# Patient Record
Sex: MALE | Race: WHITE | NOT HISPANIC OR LATINO
[De-identification: names, ages, dates, MRNs, and addresses within clinical notes are randomized per-mention and may not be internally consistent; named-entity substitution may affect disease eponyms.]

---

## 2019-01-28 ENCOUNTER — APPOINTMENT (OUTPATIENT)
Dept: SURGERY | Facility: CLINIC | Age: 59
End: 2019-01-28
Payer: MEDICARE

## 2019-01-28 VITALS
DIASTOLIC BLOOD PRESSURE: 84 MMHG | BODY MASS INDEX: 24.59 KG/M2 | SYSTOLIC BLOOD PRESSURE: 146 MMHG | WEIGHT: 166 LBS | HEIGHT: 69 IN

## 2019-01-28 DIAGNOSIS — Z87.442 PERSONAL HISTORY OF URINARY CALCULI: ICD-10-CM

## 2019-01-28 DIAGNOSIS — Z86.59 PERSONAL HISTORY OF OTHER MENTAL AND BEHAVIORAL DISORDERS: ICD-10-CM

## 2019-01-28 DIAGNOSIS — Z78.9 OTHER SPECIFIED HEALTH STATUS: ICD-10-CM

## 2019-01-28 DIAGNOSIS — Z80.0 FAMILY HISTORY OF MALIGNANT NEOPLASM OF DIGESTIVE ORGANS: ICD-10-CM

## 2019-01-28 DIAGNOSIS — Z86.39 PERSONAL HISTORY OF OTHER ENDOCRINE, NUTRITIONAL AND METABOLIC DISEASE: ICD-10-CM

## 2019-01-28 PROBLEM — Z00.00 ENCOUNTER FOR PREVENTIVE HEALTH EXAMINATION: Status: ACTIVE | Noted: 2019-01-28

## 2019-01-28 PROCEDURE — 99202 OFFICE O/P NEW SF 15 MIN: CPT

## 2019-01-29 PROBLEM — Z80.0 FAMILY HISTORY OF LIVER CANCER: Status: ACTIVE | Noted: 2019-01-28

## 2019-01-29 PROBLEM — Z86.59 HISTORY OF INTELLECTUAL DISABILITY: Status: RESOLVED | Noted: 2019-01-29 | Resolved: 2019-01-29

## 2019-01-29 PROBLEM — Z87.442 HISTORY OF KIDNEY STONES: Status: RESOLVED | Noted: 2019-01-28 | Resolved: 2019-01-29

## 2019-01-29 PROBLEM — Z86.39 HISTORY OF PHENYLKETONURIA: Status: RESOLVED | Noted: 2019-01-29 | Resolved: 2019-01-29

## 2019-01-29 PROBLEM — Z78.9 CAFFEINE USE: Status: ACTIVE | Noted: 2019-01-28

## 2019-01-29 NOTE — HISTORY OF PRESENT ILLNESS
[de-identified] : The patient is brought by his brother for a possible inguinal hernia a coin was noted on exam by his primary care physician who referred him for this surgical evaluation and for a sonogram of the area.  He does not provide any history due to his mental retardation.

## 2019-01-29 NOTE — PHYSICAL EXAM
[Normal Thyroid] : the thyroid was normal [Normal Breath Sounds] : Normal breath sounds [Normal Heart Sounds] : normal heart sounds [Normal Rate and Rhythm] : normal rate and rhythm [Abdominal Masses] : No abdominal masses [Abdomen Tenderness] : ~T ~M No abdominal tenderness [No HSM] : no hepatosplenomegaly [de-identified] : At least 6 inclusion cysts of the scalp, the largest at 2 cm in size. [de-identified] : no adenopathy [de-identified] : Soft and not distended. Questionable umbilical hernia. Freely reducible bilateral inguinal hernias with no local acute changes, much larger on the right than the left. Neither are scrotal. The external genitalia are unremarkable.

## 2019-01-29 NOTE — REASON FOR VISIT
[Initial Evaluation] : an initial evaluation [Family Member] : family member [FreeTextEntry1] : inguinal hernia

## 2019-01-29 NOTE — ASSESSMENT
[FreeTextEntry1] : Bilateral inguinal hernias as noted above, for which elective repair with mesh is advised, so as to try to avoid the possible complications of an untreated hernia, which were explained. The procedure was also explained in full with its risks and benefits and all questions were answered. The brother wishes to have this done promptly and also asks about excising the scalp cysts at the same time. I do not believe that this is advisable, and this can be left for a later date.  Appropriate precautions and warning signs were advised for the interim and he will contact the office for scheduling. As the hernias appear to be clear-cut on examination I did not feel that the sonogram needs to be done. The brother is happy with the assessment and plan.

## 2019-02-04 ENCOUNTER — OUTPATIENT (OUTPATIENT)
Dept: OUTPATIENT SERVICES | Facility: HOSPITAL | Age: 59
LOS: 1 days | Discharge: HOME | End: 2019-02-04

## 2019-02-04 VITALS
DIASTOLIC BLOOD PRESSURE: 70 MMHG | OXYGEN SATURATION: 98 % | TEMPERATURE: 99 F | WEIGHT: 162.04 LBS | HEART RATE: 16 BPM | SYSTOLIC BLOOD PRESSURE: 122 MMHG | RESPIRATION RATE: 16 BRPM | HEIGHT: 69 IN

## 2019-02-04 DIAGNOSIS — Z01.818 ENCOUNTER FOR OTHER PREPROCEDURAL EXAMINATION: ICD-10-CM

## 2019-02-04 DIAGNOSIS — K40.20 BILATERAL INGUINAL HERNIA, WITHOUT OBSTRUCTION OR GANGRENE, NOT SPECIFIED AS RECURRENT: ICD-10-CM

## 2019-02-04 DIAGNOSIS — Z98.890 OTHER SPECIFIED POSTPROCEDURAL STATES: Chronic | ICD-10-CM

## 2019-02-04 LAB
ALBUMIN SERPL ELPH-MCNC: 4.9 G/DL — SIGNIFICANT CHANGE UP (ref 3.5–5.2)
ALP SERPL-CCNC: 77 U/L — SIGNIFICANT CHANGE UP (ref 30–115)
ALT FLD-CCNC: 23 U/L — SIGNIFICANT CHANGE UP (ref 0–41)
ANION GAP SERPL CALC-SCNC: 17 MMOL/L — HIGH (ref 7–14)
APPEARANCE UR: CLEAR — SIGNIFICANT CHANGE UP
APTT BLD: 31.4 SEC — SIGNIFICANT CHANGE UP (ref 27–39.2)
AST SERPL-CCNC: 20 U/L — SIGNIFICANT CHANGE UP (ref 0–41)
BASOPHILS # BLD AUTO: 0.06 K/UL — SIGNIFICANT CHANGE UP (ref 0–0.2)
BASOPHILS NFR BLD AUTO: 0.6 % — SIGNIFICANT CHANGE UP (ref 0–1)
BILIRUB SERPL-MCNC: 0.4 MG/DL — SIGNIFICANT CHANGE UP (ref 0.2–1.2)
BILIRUB UR-MCNC: NEGATIVE — SIGNIFICANT CHANGE UP
BUN SERPL-MCNC: 14 MG/DL — SIGNIFICANT CHANGE UP (ref 10–20)
CALCIUM SERPL-MCNC: 9.5 MG/DL — SIGNIFICANT CHANGE UP (ref 8.5–10.1)
CHLORIDE SERPL-SCNC: 100 MMOL/L — SIGNIFICANT CHANGE UP (ref 98–110)
CO2 SERPL-SCNC: 23 MMOL/L — SIGNIFICANT CHANGE UP (ref 17–32)
COLOR SPEC: YELLOW — SIGNIFICANT CHANGE UP
CREAT SERPL-MCNC: 1 MG/DL — SIGNIFICANT CHANGE UP (ref 0.7–1.5)
DIFF PNL FLD: NEGATIVE — SIGNIFICANT CHANGE UP
EOSINOPHIL # BLD AUTO: 0.14 K/UL — SIGNIFICANT CHANGE UP (ref 0–0.7)
EOSINOPHIL NFR BLD AUTO: 1.4 % — SIGNIFICANT CHANGE UP (ref 0–8)
GLUCOSE SERPL-MCNC: 97 MG/DL — SIGNIFICANT CHANGE UP (ref 70–99)
GLUCOSE UR QL: NEGATIVE MG/DL — SIGNIFICANT CHANGE UP
HCT VFR BLD CALC: 46.5 % — SIGNIFICANT CHANGE UP (ref 42–52)
HGB BLD-MCNC: 15.8 G/DL — SIGNIFICANT CHANGE UP (ref 14–18)
IMM GRANULOCYTES NFR BLD AUTO: 0.3 % — SIGNIFICANT CHANGE UP (ref 0.1–0.3)
INR BLD: 0.98 RATIO — SIGNIFICANT CHANGE UP (ref 0.65–1.3)
KETONES UR-MCNC: NEGATIVE — SIGNIFICANT CHANGE UP
LEUKOCYTE ESTERASE UR-ACNC: NEGATIVE — SIGNIFICANT CHANGE UP
LYMPHOCYTES # BLD AUTO: 1.4 K/UL — SIGNIFICANT CHANGE UP (ref 1.2–3.4)
LYMPHOCYTES # BLD AUTO: 14.4 % — LOW (ref 20.5–51.1)
MCHC RBC-ENTMCNC: 32.6 PG — HIGH (ref 27–31)
MCHC RBC-ENTMCNC: 34 G/DL — SIGNIFICANT CHANGE UP (ref 32–37)
MCV RBC AUTO: 96.1 FL — HIGH (ref 80–94)
MONOCYTES # BLD AUTO: 0.81 K/UL — HIGH (ref 0.1–0.6)
MONOCYTES NFR BLD AUTO: 8.3 % — SIGNIFICANT CHANGE UP (ref 1.7–9.3)
NEUTROPHILS # BLD AUTO: 7.28 K/UL — HIGH (ref 1.4–6.5)
NEUTROPHILS NFR BLD AUTO: 75 % — SIGNIFICANT CHANGE UP (ref 42.2–75.2)
NITRITE UR-MCNC: NEGATIVE — SIGNIFICANT CHANGE UP
NRBC # BLD: 0 /100 WBCS — SIGNIFICANT CHANGE UP (ref 0–0)
PH UR: 7 — SIGNIFICANT CHANGE UP (ref 5–8)
PLATELET # BLD AUTO: 232 K/UL — SIGNIFICANT CHANGE UP (ref 130–400)
POTASSIUM SERPL-MCNC: 4.2 MMOL/L — SIGNIFICANT CHANGE UP (ref 3.5–5)
POTASSIUM SERPL-SCNC: 4.2 MMOL/L — SIGNIFICANT CHANGE UP (ref 3.5–5)
PROT SERPL-MCNC: 7 G/DL — SIGNIFICANT CHANGE UP (ref 6–8)
PROT UR-MCNC: NEGATIVE MG/DL — SIGNIFICANT CHANGE UP
PROTHROM AB SERPL-ACNC: 11.3 SEC — SIGNIFICANT CHANGE UP (ref 9.95–12.87)
RBC # BLD: 4.84 M/UL — SIGNIFICANT CHANGE UP (ref 4.7–6.1)
RBC # FLD: 11.9 % — SIGNIFICANT CHANGE UP (ref 11.5–14.5)
SODIUM SERPL-SCNC: 140 MMOL/L — SIGNIFICANT CHANGE UP (ref 135–146)
SP GR SPEC: 1.01 — SIGNIFICANT CHANGE UP (ref 1.01–1.03)
UROBILINOGEN FLD QL: 1 MG/DL (ref 0.2–0.2)
WBC # BLD: 9.72 K/UL — SIGNIFICANT CHANGE UP (ref 4.8–10.8)
WBC # FLD AUTO: 9.72 K/UL — SIGNIFICANT CHANGE UP (ref 4.8–10.8)

## 2019-02-04 NOTE — H&P PST ADULT - HISTORY OF PRESENT ILLNESS
59 Y/O MALE PRESENTS TO PAST WITH HX B/L INGUINAL HERNIA  PT NOW FOR SCHEDULED PROCEDURE. PT DENIES ANY CP SOB PALP COUGH DYSURIA FEVER URI. PT ABLE TO MAHENDRA 1-2 FOS W/O SOB.  PT ACCOMPANIED BY HIS BROTHER , LULÚ. 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  SISTER WALESKA , LEGAL GUARDIAN. OBTAINED  VERBAL  CONSENT FOR TREATMENT AND SISTER WILL BE PRESENT DOP FOR CONSENT 59 Y/O MALE PRESENTS TO PAST WITH HX B/L INGUINAL HERNIA  PT NOW FOR SCHEDULED PROCEDURE. PT'S BROTHER  DENIES ANY CP SOB PALP COUGH DYSURIA FEVER URI. PT ABLE TO MAHENDRA 1-2 FOS W/O SOB.  PT ACCOMPANIED BY HIS BROTHER , LULÚ. 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 T/C TO  SISTER WALESKA , LEGAL GUARDIAN. OBTAINED  VERBAL  CONSENT FOR TREATMENT AND SISTER WILL BE PRESENT DOP FOR CONSENT FOR SURGICAL CONSENT

## 2019-02-05 ENCOUNTER — APPOINTMENT (OUTPATIENT)
Dept: CARDIOLOGY | Facility: CLINIC | Age: 59
End: 2019-02-05

## 2019-02-05 VITALS
DIASTOLIC BLOOD PRESSURE: 84 MMHG | BODY MASS INDEX: 23.77 KG/M2 | SYSTOLIC BLOOD PRESSURE: 156 MMHG | HEIGHT: 69 IN | WEIGHT: 160.5 LBS

## 2019-02-05 DIAGNOSIS — R03.0 ELEVATED BLOOD-PRESSURE READING, W/OUT DIAGNOSIS OF HYPERTENSION: ICD-10-CM

## 2019-02-05 DIAGNOSIS — Z01.810 ENCOUNTER FOR PREPROCEDURAL CARDIOVASCULAR EXAMINATION: ICD-10-CM

## 2019-02-05 RX ORDER — DIAZEPAM 5 MG/1
5 TABLET ORAL
Qty: 10 | Refills: 0 | Status: ACTIVE | COMMUNITY
Start: 2019-01-28

## 2019-02-05 NOTE — DISCUSSION/SUMMARY
[FreeTextEntry1] : No further cardiac testing required prior to above surgery.\par Regular PMD follow-up / BP monitoring.\par Follow-up cardiology as needed.\par \par Above discussed with brother who expressed understanding.

## 2019-02-05 NOTE — HISTORY OF PRESENT ILLNESS
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  Accompanied by brother, who is his caretaker.\par \par Scheduled for bilateral inguinal hernia repair.\par \par Very limited conversation with patient.  Brother reports regular walking ([facility]) without symptoms.\par \par No apparent chest pain, dyspnea,lightheadedness, syncope.\par \par EKG (PAST 2/4/19): NSR 83, PAC, artifact.

## 2019-02-05 NOTE — ASSESSMENT
[FreeTextEntry1] : Low risk patient for perioperative cardiac events by RCRI (0).\par Low to intermediate risk procedure.\par No cardiac decompensation.\par Isolated PAC on EKG.\par Functional capacity > 4METS without apparent symptoms.\par \par BP elevated.  Repeat better.

## 2019-02-08 ENCOUNTER — RESULT REVIEW (OUTPATIENT)
Age: 59
End: 2019-02-08

## 2019-02-08 ENCOUNTER — APPOINTMENT (OUTPATIENT)
Dept: SURGERY | Facility: AMBULATORY SURGERY CENTER | Age: 59
End: 2019-02-08

## 2019-02-08 ENCOUNTER — OUTPATIENT (OUTPATIENT)
Dept: OUTPATIENT SERVICES | Facility: HOSPITAL | Age: 59
LOS: 1 days | Discharge: HOME | End: 2019-02-08
Payer: MEDICARE

## 2019-02-08 VITALS — TEMPERATURE: 98 F | RESPIRATION RATE: 74 BRPM | HEART RATE: 78 BPM | OXYGEN SATURATION: 97 %

## 2019-02-08 VITALS
RESPIRATION RATE: 20 BRPM | TEMPERATURE: 99 F | DIASTOLIC BLOOD PRESSURE: 92 MMHG | SYSTOLIC BLOOD PRESSURE: 197 MMHG | HEIGHT: 69 IN | HEART RATE: 86 BPM | WEIGHT: 162.04 LBS | OXYGEN SATURATION: 97 %

## 2019-02-08 DIAGNOSIS — Z98.890 OTHER SPECIFIED POSTPROCEDURAL STATES: Chronic | ICD-10-CM

## 2019-02-08 PROBLEM — E70.0 CLASSICAL PHENYLKETONURIA: Chronic | Status: ACTIVE | Noted: 2019-02-04

## 2019-02-08 PROBLEM — K46.9 UNSPECIFIED ABDOMINAL HERNIA WITHOUT OBSTRUCTION OR GANGRENE: Chronic | Status: ACTIVE | Noted: 2019-02-04

## 2019-02-08 PROCEDURE — 49505 PRP I/HERN INIT REDUC >5 YR: CPT | Mod: 50

## 2019-02-08 PROCEDURE — 12034 INTMD RPR S/TR/EXT 7.6-12.5: CPT | Mod: 59

## 2019-02-08 PROCEDURE — 55520 REMOVAL OF SPERM CORD LESION: CPT | Mod: XS

## 2019-02-08 RX ORDER — ONDANSETRON 8 MG/1
4 TABLET, FILM COATED ORAL ONCE
Qty: 0 | Refills: 0 | Status: DISCONTINUED | OUTPATIENT
Start: 2019-02-08 | End: 2019-02-23

## 2019-02-08 RX ORDER — SODIUM CHLORIDE 9 MG/ML
1000 INJECTION, SOLUTION INTRAVENOUS
Qty: 0 | Refills: 0 | Status: DISCONTINUED | OUTPATIENT
Start: 2019-02-08 | End: 2019-02-23

## 2019-02-08 RX ORDER — MORPHINE SULFATE 50 MG/1
2 CAPSULE, EXTENDED RELEASE ORAL
Qty: 0 | Refills: 0 | Status: DISCONTINUED | OUTPATIENT
Start: 2019-02-08 | End: 2019-02-08

## 2019-02-08 RX ORDER — ACETAMINOPHEN WITH CODEINE 300MG-30MG
1 TABLET ORAL
Qty: 20 | Refills: 0 | OUTPATIENT
Start: 2019-02-08

## 2019-02-08 RX ADMIN — SODIUM CHLORIDE 100 MILLILITER(S): 9 INJECTION, SOLUTION INTRAVENOUS at 15:05

## 2019-02-08 NOTE — BRIEF OPERATIVE NOTE - PROCEDURE
<<-----Click on this checkbox to enter Procedure Repair of bilateral inguinal hernias with mesh  02/08/2019    Active  RADHA

## 2019-02-14 DIAGNOSIS — K40.20 BILATERAL INGUINAL HERNIA, WITHOUT OBSTRUCTION OR GANGRENE, NOT SPECIFIED AS RECURRENT: ICD-10-CM

## 2019-02-14 DIAGNOSIS — D17.6 BENIGN LIPOMATOUS NEOPLASM OF SPERMATIC CORD: ICD-10-CM

## 2019-02-19 LAB — SURGICAL PATHOLOGY STUDY: SIGNIFICANT CHANGE UP

## 2019-02-21 ENCOUNTER — APPOINTMENT (OUTPATIENT)
Dept: SURGERY | Facility: CLINIC | Age: 59
End: 2019-02-21
Payer: MEDICARE

## 2019-02-21 VITALS
HEIGHT: 69 IN | SYSTOLIC BLOOD PRESSURE: 146 MMHG | DIASTOLIC BLOOD PRESSURE: 82 MMHG | WEIGHT: 164 LBS | BODY MASS INDEX: 24.29 KG/M2

## 2019-02-21 DIAGNOSIS — K40.20 BILATERAL INGUINAL HERNIA, W/OUT OBSTRUCTION OR GANGRENE, NOT SPECIFIED AS RECURRENT: ICD-10-CM

## 2019-02-21 PROCEDURE — 99024 POSTOP FOLLOW-UP VISIT: CPT

## 2019-02-21 NOTE — ASSESSMENT
[FreeTextEntry1] : No postop problems noted after the recent BIHR.  Further care and activity instructions were reviewed. The patient's siblings are interested in having the inclusion cysts removed as they appear to be bothersome to the patient. This can be done in the OR setting but given his mental status, general anesthesia would be preferred. The procedure was discussed and all his questions were answered, he will contact the office for scheduling.

## 2019-02-21 NOTE — HISTORY OF PRESENT ILLNESS
[de-identified] : The patient is brought by his brother for his first postoperative visit following the recent bilateral inguinal hernia repair. He reports no problems in terms of activity, pain, bowel or bladder function. He reports good p.o. intake,  and apparently required minimal analgesics.

## 2019-02-21 NOTE — PHYSICAL EXAM
[Abdominal Masses] : No abdominal masses [Abdomen Tenderness] : ~T ~M No abdominal tenderness [No HSM] : no hepatosplenomegaly [de-identified] : Active and in no distress [de-identified] : At least 6 noninflamed inclusion cysts of the scalp, measuring up to about 2 cm in greatest size. [de-identified] : Abdomen soft and not distended. Bilateral inguinal incisions are clean and dry with minimal local induration and no evidence of hematoma or infection area.  The repairs appear intact.  The external genitalia are unremarkable.

## 2019-03-05 ENCOUNTER — APPOINTMENT (OUTPATIENT)
Dept: SURGERY | Facility: HOSPITAL | Age: 59
End: 2019-03-05

## 2019-03-05 ENCOUNTER — RESULT REVIEW (OUTPATIENT)
Age: 59
End: 2019-03-05

## 2019-03-05 ENCOUNTER — OUTPATIENT (OUTPATIENT)
Dept: OUTPATIENT SERVICES | Facility: HOSPITAL | Age: 59
LOS: 1 days | Discharge: HOME | End: 2019-03-05
Payer: MEDICARE

## 2019-03-05 VITALS
WEIGHT: 164.02 LBS | OXYGEN SATURATION: 98 % | HEIGHT: 68 IN | HEART RATE: 77 BPM | TEMPERATURE: 97 F | SYSTOLIC BLOOD PRESSURE: 163 MMHG | DIASTOLIC BLOOD PRESSURE: 85 MMHG | RESPIRATION RATE: 18 BRPM

## 2019-03-05 VITALS — DIASTOLIC BLOOD PRESSURE: 69 MMHG | RESPIRATION RATE: 20 BRPM | HEART RATE: 78 BPM | SYSTOLIC BLOOD PRESSURE: 131 MMHG

## 2019-03-05 DIAGNOSIS — Z98.890 OTHER SPECIFIED POSTPROCEDURAL STATES: Chronic | ICD-10-CM

## 2019-03-05 PROCEDURE — 21011 EXC FACE LES SC <2 CM: CPT | Mod: 78

## 2019-03-05 RX ORDER — MIDAZOLAM HYDROCHLORIDE 1 MG/ML
20 INJECTION, SOLUTION INTRAMUSCULAR; INTRAVENOUS ONCE
Qty: 0 | Refills: 0 | Status: DISCONTINUED | OUTPATIENT
Start: 2019-03-05 | End: 2019-03-20

## 2019-03-05 RX ORDER — DIAZEPAM 5 MG
0 TABLET ORAL
Qty: 0 | Refills: 0 | COMMUNITY

## 2019-03-05 NOTE — BRIEF OPERATIVE NOTE - PROCEDURE
<<-----Click on this checkbox to enter Procedure Excision of sebaceous cyst of scalp  03/05/2019  Excision of multiple scalp sebaceous cyst (x7)  Active  [id number]

## 2019-03-05 NOTE — ASU DISCHARGE PLAN (ADULT/PEDIATRIC). - NOTIFY
Fever greater than 101/Swelling that continues/Bleeding that does not stop/Pain not relieved by Medications/Increased Irritability or Sluggishness

## 2019-03-05 NOTE — ASU DISCHARGE PLAN (ADULT/PEDIATRIC). - SPECIAL INSTRUCTIONS
As above, please follow up at your scheduled appointment with Dr. Vargas, if there are any issues please call the number below    Please use tylenol and motrin as needed/directed for pain control

## 2019-03-05 NOTE — ASU PREOP CHECKLIST - PATIENT SENT TO
holding area holding area/report to ARCHANA Mauro RN, OR vitor holding area/report to ARCHANA Mauro RN, OR circulator.

## 2019-03-05 NOTE — PRE-ANESTHESIA EVALUATION ADULT - NSANTHDISPORD_GEN_ALL_CORE
Date: 3/25/2024    Patient Name: Candida Thomason          To Whom it may concern:    This letter has been written at the patient's request. The above patient was seen at [facility] for treatment of a medical condition.    Candida may work and drive without restrictions.         Sincerely,          Lizy Gerardo, DO      
PACU

## 2019-03-08 LAB — SURGICAL PATHOLOGY STUDY: SIGNIFICANT CHANGE UP

## 2019-03-11 DIAGNOSIS — L72.0 EPIDERMAL CYST: ICD-10-CM

## 2019-03-18 ENCOUNTER — APPOINTMENT (OUTPATIENT)
Dept: SURGERY | Facility: CLINIC | Age: 59
End: 2019-03-18
Payer: MEDICARE

## 2019-03-18 VITALS
WEIGHT: 164 LBS | BODY MASS INDEX: 24.29 KG/M2 | DIASTOLIC BLOOD PRESSURE: 82 MMHG | HEIGHT: 69 IN | SYSTOLIC BLOOD PRESSURE: 160 MMHG

## 2019-03-18 DIAGNOSIS — L72.9 FOLLICULAR CYST OF THE SKIN AND SUBCUTANEOUS TISSUE, UNSPECIFIED: ICD-10-CM

## 2019-03-18 PROCEDURE — 99024 POSTOP FOLLOW-UP VISIT: CPT

## 2019-03-18 NOTE — PLAN
[FreeTextEntry1] : Local care reviewed and all questions answered.\par RTO in about 2 mos. for final check after hernia surgery.\par

## 2019-06-17 ENCOUNTER — APPOINTMENT (OUTPATIENT)
Dept: SURGERY | Facility: CLINIC | Age: 59
End: 2019-06-17

## 2021-04-14 NOTE — H&P PST ADULT - AIRWAY
Exam Date:  4/14/2021 8:46 AM



XR HAND_RIGHT 3 VIEWS



Indication: Reason: RIGHT HAND INJURED AT SCHOOL TODAY / Spl. Instructions:  / History:  



FINDINGS/

IMPRESSION:  





No acute fracture or dislocation.  Alignment and joint spaces are maintained.  The soft tissues are w
ithin normal limits.  



Electronically signed by: Shon Perea MD (4/14/2021 9:10 AM) [id number]
normal

## 2024-05-07 NOTE — H&P PST ADULT - NS NEC GEN PE MLT EXAM PC
Per 2/28/2024 Office visit/telephone encounter: I will start Zepbound 2.5 mg inject once weekly for 1 month, then increase Zepbound 5.0 mg inject once weekly for 1 month and then increase to Zepbound 7.5 mg inject once weekly for 1 month, then increase to Nbelmdly21.0 mg inject once weekly for 1 month and then titrate to Zepbound 15.0 mg once a week as tolerated.   Last Office Visit: 2/28/2024  Next Office Visit: 9/3/2024      Script sent.    No bruits; no thyromegaly or nodules

## 2024-08-13 NOTE — ASU DISCHARGE PLAN (ADULT/PEDIATRIC). - ASU FOLLOWUP
Detail Level: Zone
Plan: Pt is picking this sore. Advised the pt to not pick and apply the mupirocin ointment constantly. If no improvement advised pt to see Dr.Frankel for a BX
Render In Strict Bullet Format?: No
Continue Regimen: Mupirocin ointment
Discontinue Regimen: Silvadene
[facility]:  [facility]...

## 2025-06-10 NOTE — H&P PST ADULT - MEDICATION HERBAL REMEDIES, PROFILE
Med rec is complete per Patient at bedside.     Reconcile outside Information was available?N  Allergies reviewed.    Has patient had any outside antibiotics in the last 30 days? N    Any Anticoagulants (rivaroxaban, apixaban, edoxaban, dabigatran, warfarin, enoxaparin) taken in the last 14 days? N         Pharmacy/Pharmacies Pt utilizes : [facility] [phone]          no